# Patient Record
Sex: MALE | NOT HISPANIC OR LATINO | URBAN - METROPOLITAN AREA
[De-identification: names, ages, dates, MRNs, and addresses within clinical notes are randomized per-mention and may not be internally consistent; named-entity substitution may affect disease eponyms.]

---

## 2022-04-06 ENCOUNTER — TRANSFERRED RECORDS (OUTPATIENT)
Dept: HEALTH INFORMATION MANAGEMENT | Facility: CLINIC | Age: 22
End: 2022-04-06

## 2022-12-11 ENCOUNTER — TRANSFERRED RECORDS (OUTPATIENT)
Dept: HEALTH INFORMATION MANAGEMENT | Facility: CLINIC | Age: 22
End: 2022-12-11

## 2022-12-11 LAB
ALT SERPL-CCNC: 8 U/L (ref 3–12)
AST SERPL-CCNC: 8 U/L (ref 7–40)
CREATININE (EXTERNAL): 1.04 MG/DL (ref 0.4–1.24)
GFR ESTIMATED (EXTERNAL): >60 ML/MIN/1.73M2
GLUCOSE (EXTERNAL): 75 MG/DL (ref 70–100)
POTASSIUM (EXTERNAL): 3.8 MMOL/L (ref 3.5–5.1)

## 2023-02-13 ENCOUNTER — MEDICAL CORRESPONDENCE (OUTPATIENT)
Dept: TRANSPLANT | Facility: CLINIC | Age: 23
End: 2023-02-13

## 2023-02-14 ENCOUNTER — TELEPHONE (OUTPATIENT)
Dept: TRANSPLANT | Facility: CLINIC | Age: 23
End: 2023-02-14

## 2023-02-14 DIAGNOSIS — E76.3 MUCOPOLYSACCHARIDOSIS (H): Primary | ICD-10-CM

## 2023-02-22 ENCOUNTER — DOCUMENTATION ONLY (OUTPATIENT)
Dept: OTHER | Facility: CLINIC | Age: 23
End: 2023-02-22

## 2023-03-01 ENCOUNTER — MEDICAL CORRESPONDENCE (OUTPATIENT)
Dept: TRANSPLANT | Facility: CLINIC | Age: 23
End: 2023-03-01

## 2023-03-15 ENCOUNTER — MEDICAL CORRESPONDENCE (OUTPATIENT)
Dept: HEALTH INFORMATION MANAGEMENT | Facility: CLINIC | Age: 23
End: 2023-03-15

## 2023-08-21 ENCOUNTER — TELEPHONE (OUTPATIENT)
Dept: OPHTHALMOLOGY | Facility: CLINIC | Age: 23
End: 2023-08-21

## 2023-08-21 DIAGNOSIS — E76.1 MUCOPOLYSACCHARIDOSIS, TYPE 2 (H): Primary | ICD-10-CM

## 2023-08-21 NOTE — TELEPHONE ENCOUNTER
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 Is there a coordinator in the BMT that is lining these all up?    Thank you,    Action Taken: Message routed to:  [facility] eye    Travel Screening: Not Applicable

## 2023-08-22 NOTE — TELEPHONE ENCOUNTER
Spoke to  guardian/timo  at [phone]    Timo states BMT coordinator should be reaching out to review scheduling.    Reviewed if future appt's few weeks out should have no problems scheduling eye exam when pt in town.    Reviewed may ask BMT coordinator to send message to Jesus Gonzales RN once scheduled and can reach out to timo for scheduling eye exam.    Jesus Gonzales RN 4:49 PM 08/22/23

## 2024-05-23 ENCOUNTER — TELEPHONE (OUTPATIENT)
Dept: NEUROSURGERY | Facility: CLINIC | Age: 24
End: 2024-05-23

## 2024-05-23 NOTE — TELEPHONE ENCOUNTER
Left Voicemail (1st Attempt) for the patient to call back and schedule the following:    Appointment type: New adult neurosurgery  Provider: Adrianna Salgado  Return date: First available  Specialty phone number: [phone]  Additional appointment(s) needed: na  Additonal Notes: Old referral from August 2023.

## 2024-05-30 ENCOUNTER — TELEPHONE (OUTPATIENT)
Dept: NEUROSURGERY | Facility: CLINIC | Age: 24
End: 2024-05-30

## 2024-05-30 NOTE — TELEPHONE ENCOUNTER
Left Voicemail (2nd Attempt) for the patient to call back and schedule the following:    Appointment type: New adult neurosg  Provider: Adrianna Salgado  Return date: First available  Specialty phone number: [phone]  Additional appointment(s) needed: na  Additonal Notes: Old referral from August 2023